# Patient Record
(demographics unavailable — no encounter records)

---

## 2024-11-19 NOTE — CONSULT LETTER
[Dear  ___] : Dear  [unfilled], [Courtesy Letter:] : I had the pleasure of seeing your patient, [unfilled], in my office today. [Please see my note below.] : Please see my note below. [Sincerely,] : Sincerely, [FreeTextEntry2] : Dr. Jerry Mulligan [FreeTextEntry3] : Martínez Velásquez MD, [facility] Otolaryngology Sinus and Endoscopic Skull Base Surgery Head and Neck Surgery   [address] [facility], [address] [place] [id number] Tel: [phone] Fax:[phone]

## 2024-11-19 NOTE — PHYSICAL EXAM
[Nasal Endoscopy Performed] : nasal endoscopy was performed, see procedure section for findings [Normal] : mucosa is normal [Midline] : trachea located in midline position [de-identified] : Small inclusion cyst on superior pole of R tonsil

## 2024-11-19 NOTE — PROCEDURE
[FreeTextEntry6] : Procedure: Bilateral nasal endoscopy (CPT 40118)   Indication: Anterior rhinoscopy was inadequate to evaluate pathology.  Left: Septum midline Moderate inferior turbinate hypertrophy  Middle meatus clear, without masses, polyps, or pus Sphenoethmoidal recess clear, without masses, polyps, or pus  Right:  Septum deviated right  Moderate inferior turbinate hypertrophy Middle meatus clear, without masses, polyps, or pus  Sphenoethmoidal recess clear, without masses, polyps, or pus  [Risk and Benefits Discussed] : The purpose, risks, discomforts, benefits and alternatives of the procedure have been explained to the patient including no treatment. [Cerumen Impaction] : Cerumen Impaction [Same] : same as the Pre Op Dx. [] : Removal of Cerumen

## 2024-11-19 NOTE — HISTORY OF PRESENT ILLNESS
[de-identified] : 59M presenting for white spot on right tonsil for several weeks. Patient complaining of cryptic tonsils, tonsil stones, halitosis, globus. Pt also reports clogged ear sensation, nasal congestion and tinnitus. Denies dysphagia, odynophagia, dysphonia. Denies otalgia. Breathes through nose at baseline, but reports nasal congestion on both sides. Does not use sprays/irrigations. Denies smoking/alcohol use.

## 2024-11-19 NOTE — PLAN
[TextEntry] : There was inflammation of the bilateral nasal cavities noted on endoscopy today.  I recommend a nasal hygiene regimen including saline irrigations followed by INCS (2 sprays each nostril) BID. We reviewed proper positioning. We reviewed risks of INCS.  Cerumen - cleared without difficulty.   The inclusion cyst on the right tonsil was only partially grasped with forceps, but pt did not tolerate additional attempts. We will send off biopsy we have, which was adjacent normal appearing tonsil tissue.   Will call with path.